# Patient Record
Sex: MALE | Race: BLACK OR AFRICAN AMERICAN | NOT HISPANIC OR LATINO | Employment: PART TIME | URBAN - METROPOLITAN AREA
[De-identification: names, ages, dates, MRNs, and addresses within clinical notes are randomized per-mention and may not be internally consistent; named-entity substitution may affect disease eponyms.]

---

## 2024-10-10 PROBLEM — Z72.0 TOBACCO ABUSE: Status: ACTIVE | Noted: 2024-10-10

## 2024-10-10 PROBLEM — R03.0 ELEVATED BLOOD PRESSURE READING: Status: ACTIVE | Noted: 2024-10-10

## 2024-10-10 PROBLEM — F19.10 DRUG ABUSE: Status: ACTIVE | Noted: 2024-10-10

## 2024-10-15 ENCOUNTER — TELEPHONE (OUTPATIENT)
Dept: SMOKING CESSATION | Facility: CLINIC | Age: 44
End: 2024-10-15

## 2024-10-15 NOTE — TELEPHONE ENCOUNTER
Called patient to confirm clinic intake appointment for smoking cessation on 10/16/24. No answer. Left voice message.

## 2024-10-16 ENCOUNTER — TELEPHONE (OUTPATIENT)
Dept: SMOKING CESSATION | Facility: CLINIC | Age: 44
End: 2024-10-16

## 2024-10-16 NOTE — TELEPHONE ENCOUNTER
Patient has not arrived for clinic intake appointment for smoking cessation. I called to confirm or reschedule. No answer. Left a voice message with my contact information.

## 2024-11-07 ENCOUNTER — TELEPHONE (OUTPATIENT)
Dept: SMOKING CESSATION | Facility: CLINIC | Age: 44
End: 2024-11-07
Payer: COMMERCIAL

## 2024-11-07 NOTE — TELEPHONE ENCOUNTER
Called patient to rescheduled intake appointment for smoking cessation. Patient's relative answer and stated he was gone to get something to eat. She wrote down the message so the patient could return my call.